# Patient Record
Sex: MALE | Race: BLACK OR AFRICAN AMERICAN | NOT HISPANIC OR LATINO | URBAN - METROPOLITAN AREA
[De-identification: names, ages, dates, MRNs, and addresses within clinical notes are randomized per-mention and may not be internally consistent; named-entity substitution may affect disease eponyms.]

---

## 2017-09-04 ENCOUNTER — EMERGENCY (EMERGENCY)
Facility: HOSPITAL | Age: 62
LOS: 1 days | Discharge: HOME | End: 2017-09-04
Attending: EMERGENCY MEDICINE | Admitting: EMERGENCY MEDICINE
Payer: SELF-PAY

## 2017-09-04 VITALS
DIASTOLIC BLOOD PRESSURE: 95 MMHG | TEMPERATURE: 97 F | RESPIRATION RATE: 18 BRPM | OXYGEN SATURATION: 96 % | SYSTOLIC BLOOD PRESSURE: 147 MMHG | HEART RATE: 84 BPM | WEIGHT: 179.02 LBS

## 2017-09-04 DIAGNOSIS — S01.511A LACERATION WITHOUT FOREIGN BODY OF LIP, INITIAL ENCOUNTER: ICD-10-CM
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
## 2017-09-04 DIAGNOSIS — Z91.013 ALLERGY TO SEAFOOD: ICD-10-CM
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
## 2017-09-04 PROCEDURE — 99053 MED SERV 10PM-8AM 24 HR FAC: CPT

## 2017-09-04 PROCEDURE — 99285 EMERGENCY DEPT VISIT HI MDM: CPT | Mod: 25

## 2017-09-04 PROCEDURE — 12051 INTMD RPR FACE/MM 2.5 CM/<: CPT

## 2017-09-04 PROCEDURE — 90715 TDAP VACCINE 7 YRS/> IM: CPT

## 2017-09-04 PROCEDURE — 99284 EMERGENCY DEPT VISIT MOD MDM: CPT | Mod: 25

## 2017-09-04 PROCEDURE — 90471 IMMUNIZATION ADMIN: CPT

## 2017-09-04 RX ORDER — TETANUS TOXOID, REDUCED DIPHTHERIA TOXOID AND ACELLULAR PERTUSSIS VACCINE, ADSORBED 5; 2.5; 8; 8; 2.5 [IU]/.5ML; [IU]/.5ML; UG/.5ML; UG/.5ML; UG/.5ML
0.5 SUSPENSION INTRAMUSCULAR ONCE
Qty: 0 | Refills: 0 | Status: COMPLETED | OUTPATIENT
Start: 2017-09-04 | End: 2017-09-04

## 2017-09-04 RX ADMIN — TETANUS TOXOID, REDUCED DIPHTHERIA TOXOID AND ACELLULAR PERTUSSIS VACCINE, ADSORBED 0.5 MILLILITER(S): 5; 2.5; 8; 8; 2.5 SUSPENSION INTRAMUSCULAR at 23:36

## 2017-09-04 NOTE — ED PROVIDER NOTE - OBJECTIVE STATEMENT
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

## 2017-09-04 NOTE — ED ADULT NURSE NOTE - OBJECTIVE STATEMENT
Patient presents with laceration to upper lip after a piece of a glass fell on his lip. Will continue to monitor patient.

## 2017-09-04 NOTE — ED PROVIDER NOTE - MEDICAL DECISION MAKING DETAILS
healthy 63 yo male with cut above lip by glass bottle.  will give tetanus.  laceration contacts the vermillion border, will consult plastics to close.

## 2020-01-06 NOTE — ED PROVIDER NOTE - CONSTITUTIONAL DISTRESS
no apparent Bexarotene Pregnancy And Lactation Text: This medication is Pregnancy Category X and should not be given to women who are pregnant or may become pregnant. This medication should not be used if you are breast feeding.

## 2023-03-06 ENCOUNTER — EMERGENCY (EMERGENCY)
Facility: HOSPITAL | Age: 68
LOS: 1 days | Discharge: HOME | End: 2023-03-06
Admitting: EMERGENCY MEDICINE
Payer: OTHER MISCELLANEOUS

## 2023-03-06 VITALS
HEIGHT: 68 IN | WEIGHT: 175.05 LBS | HEART RATE: 71 BPM | RESPIRATION RATE: 18 BRPM | SYSTOLIC BLOOD PRESSURE: 143 MMHG | TEMPERATURE: 98 F | DIASTOLIC BLOOD PRESSURE: 89 MMHG | OXYGEN SATURATION: 98 %

## 2023-03-06 PROCEDURE — 99283 EMERGENCY DEPT VISIT LOW MDM: CPT

## 2023-03-06 PROCEDURE — 99282 EMERGENCY DEPT VISIT SF MDM: CPT

## 2023-03-06 NOTE — ED PROVIDER NOTE - PATIENT PORTAL LINK FT
You can access the FollowMyHealth Patient Portal offered by [facility] by registering at the following website: [email or web address]. By joining [email or web address]’s FollowMyHealth portal, you will also be able to view your health information using other applications (apps) compatible with our system.

## 2023-03-06 NOTE — ED ADULT NURSE NOTE - OBJECTIVE STATEMENT
Pt received aaox3 c/o right eye pain after he was hit in the eye with a garbage bag around 3pm today. Denies LOC.  Denies headache, nausea, vomiting, dizziness, visual changes.  Patient not on any anticoagulation.  Reports some redness to the eye.

## 2023-03-06 NOTE — ED PROVIDER NOTE - CLINICAL SUMMARY MEDICAL DECISION MAKING FREE TEXT BOX
67-year-old male with no past medical history complaining of right eye ayleen/redness  after he was hit in the eye with a garbage bag around 3pm today.  Denies headache, nausea, vomiting, dizziness, visual changes.  PERRL; EOM intact; R eye- + subconjunctival hemorrhage to lateral aspect of eye, no fluorescein uptake. +subconjunctival hemorrhage

## 2023-03-06 NOTE — ED PROVIDER NOTE - NSFOLLOWUPINSTRUCTIONS_ED_ALL_ED_FT
Subconjunctival Hemorrhage    WHAT YOU NEED TO KNOW:    A subconjunctival hemorrhage is when blood collects under the conjunctiva in your eye. The conjunctiva is the clear lining that covers the white part of your eye. The blood comes from broken blood vessels under the conjunctiva.    DISCHARGE INSTRUCTIONS:    Care for your eye:   •Cold or warm compress: Use a cold pack during the first 24 hours. Ask how often to apply it and for how long each time. After the first 24 hours, apply a warm pack on your eye. Do this 3 times each day for about 10 to 15 minutes each time.      •Eyedrops: You may need artificial tears to keep your eye moist. Use the drops as directed.  Steps 1 2 3 4           Follow up with your healthcare provider or eye specialist as directed: Write down your questions so you remember to ask them during your visits.    Contact your healthcare provider or eye specialist if:   •The redness in your eye has not gone away after 3 weeks.      •You have another subconjunctival hemorrhage.      •You have subconjunctival hemorrhages in both eyes.      •You have questions or concerns about your condition or care.      Return to the emergency department if:   •You have eye pain or sensitivity to light.      •Your vision changes.      •You have white or yellow discharge from your eye.

## 2023-03-06 NOTE — ED ADULT NURSE NOTE - CCCP TRG CHIEF CMPLNT
Pharmacist Admission Medication Reconciliation Pending Note    Prior to Admission Medications were reviewed by the pharmacist and pended for provider review during admission medication reconciliation.    Medications were pended by the pharmacist at this time as follows:    Pended Admission Order Reconciliation Actions     Order Name Action Reordered As    Docusate Sodium (COLACE PO) Do Not Order for Admission     acetaminophen (TYLENOL) 325 MG tablet Do Not Order for Admission     fluconazole (DIFLUCAN) 150 MG tablet Do Not Order for Admission     ibuprofen (MOTRIN) 200 MG tablet Do Not Order for Admission     Prenatal Vit-Fe Fumarate-FA (PRENATAL ONE DAILY PO) Order for Admission multivitamin & mineral w/folic acid- PRENATAL tablet 1 tablet    docusate calcium (SURFAK) capsule 240 mg New at Admission     fluconazole (DIFLUCAN) tablet 150 mg New at Admission             Orders Pended To Continue For Hospital Stay     ID Description Pended By When Reason    [id number] docusate calcium (SURFAK) capsule 240 mg-DAILY PRN Byron Flores [facility] 08/30/17 0808     [id number] multivitamin & mineral w/folic acid- PRENATAL tablet 1 tablet-DAILY Byron Flores [facility] 08/30/17 0808     [id number] fluconazole (DIFLUCAN) tablet 150 mg-ONCE Byron Flores [facility] 08/30/17 0808             Pharmacist Notations:     Orders are pended in the New Orders section of the Med Rec Navigator.  PTA medications not addressed:  Augmentin - possible change in antibiotic therapy as inpatient.    Last edited by Byron Flores RPH on 08/30/17 at 0809          Orders that are ultimately reconciled and signed during admission medication reconciliation may differ from the pended actions above.    Please contact the pharmacist for questions.    Byron THOMAS RPH  8/30/2017 8:09 AM  
eye pain traumatic

## 2023-03-06 NOTE — ED PROVIDER NOTE - OBJECTIVE STATEMENT
67-year-old male with no past medical history complaining of right eye pain after he was hit in the eye with a garbage bag around 3pm today.  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  Denies LOC.  Denies headache, nausea, vomiting, dizziness, visual changes.  Patient not on any anticoagulation.  Reports some redness to the eye

## 2023-03-06 NOTE — ED ADULT NURSE NOTE - PRO INTERPRETER NEED 2
English Anesthesia Type: 1% lidocaine with 1:200,000 epinephrine and a 1:10 solution of 8.4% sodium bicarbonate

## 2023-03-06 NOTE — ED ADULT TRIAGE NOTE - BP NONINVASIVE SYSTOLIC (MM HG)
Codi [phone] advocate  [facility] radiology department called. Codi would like a call regarding Ultra sound, she states she needs to know what the doctor is evaluating.   [phone]

## 2023-03-06 NOTE — ED PROVIDER NOTE - PHYSICAL EXAMINATION
CONSTITUTIONAL: Well-appearing;  in no apparent distress.   HEAD: Normocephalic; atraumatic.   EYES: PERRL; EOM intact; R eye- + subconjunctival hemorrhage to lateral aspect of eye, no fluorescein uptake   ENT: normal nose; no rhinorrhea; normal pharynx with no erythema or lesions.   NECK: Supple; non-tender;   CARDIOVASCULAR: rrr,  RESPIRATORY: Breathing easily;   MSK: FROM at all extremities, normal tone   EXT: No cyanosis or edema; N/V intact  SKIN: Normal for age and race; warm; dry; good turgor; no apparent lesions or rash.

## 2023-03-07 DIAGNOSIS — Z91.013 ALLERGY TO SEAFOOD: ICD-10-CM
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
## 2023-03-07 DIAGNOSIS — H57.11 OCULAR PAIN, RIGHT EYE: ICD-10-CM

## 2023-03-07 DIAGNOSIS — H11.31 CONJUNCTIVAL HEMORRHAGE, RIGHT EYE: ICD-10-CM
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
## 2024-04-23 NOTE — ED ADULT TRIAGE NOTE - SOURCE OF INFORMATION
PT'S PHARMACY CALLED + SAID THEY ARE ALL OUT OF STOCK OF THE   tirzepatide (Mounjaro) 5 mg/0.5 mL pen injector, BUT THEY HAVE EVERY OTHER DOSE IN STOCK.    WOULD LIKE TO KNOW IF CJ WANTS TO ORDER ANOTHER DOSE.    PLEASE ADVISE    Patient

## 2024-06-10 ENCOUNTER — EMERGENCY (EMERGENCY)
Facility: HOSPITAL | Age: 69
LOS: 1 days | Discharge: HOME | End: 2024-06-10
Attending: STUDENT IN AN ORGANIZED HEALTH CARE EDUCATION/TRAINING PROGRAM | Admitting: STUDENT IN AN ORGANIZED HEALTH CARE EDUCATION/TRAINING PROGRAM
Payer: SELF-PAY

## 2024-06-10 VITALS
TEMPERATURE: 98 F | SYSTOLIC BLOOD PRESSURE: 149 MMHG | OXYGEN SATURATION: 95 % | HEART RATE: 88 BPM | DIASTOLIC BLOOD PRESSURE: 87 MMHG | RESPIRATION RATE: 16 BRPM

## 2024-06-10 DIAGNOSIS — S61.214A LACERATION WITHOUT FOREIGN BODY OF RIGHT RING FINGER WITHOUT DAMAGE TO NAIL, INITIAL ENCOUNTER: ICD-10-CM
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
## 2024-06-10 DIAGNOSIS — Z91.013 ALLERGY TO SEAFOOD: ICD-10-CM

## 2024-06-10 DIAGNOSIS — Z23 ENCOUNTER FOR IMMUNIZATION: ICD-10-CM
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
## 2024-06-10 PROCEDURE — 73130 X-RAY EXAM OF HAND: CPT | Mod: 26,RT

## 2024-06-10 PROCEDURE — 12001 RPR S/N/AX/GEN/TRNK 2.5CM/<: CPT

## 2024-06-10 PROCEDURE — 12041 INTMD RPR N-HF/GENIT 2.5CM/<: CPT | Mod: F8

## 2024-06-10 PROCEDURE — 73130 X-RAY EXAM OF HAND: CPT

## 2024-06-10 PROCEDURE — 99284 EMERGENCY DEPT VISIT MOD MDM: CPT | Mod: 25

## 2024-06-10 PROCEDURE — 90471 IMMUNIZATION ADMIN: CPT

## 2024-06-10 PROCEDURE — 90715 TDAP VACCINE 7 YRS/> IM: CPT

## 2024-06-10 PROCEDURE — 73140 X-RAY EXAM OF FINGER(S): CPT

## 2024-06-10 PROCEDURE — 73140 X-RAY EXAM OF FINGER(S): CPT | Mod: 26,59,RT

## 2024-06-10 RX ORDER — BACITRACIN ZINC 500 UNIT/G
1 OINTMENT IN PACKET (EA) TOPICAL ONCE
Refills: 0 | Status: COMPLETED | OUTPATIENT
Start: 2024-06-10 | End: 2024-06-10

## 2024-06-10 RX ORDER — TETANUS TOXOID, REDUCED DIPHTHERIA TOXOID AND ACELLULAR PERTUSSIS VACCINE, ADSORBED 5; 2.5; 8; 8; 2.5 [IU]/.5ML; [IU]/.5ML; UG/.5ML; UG/.5ML; UG/.5ML
0.5 SUSPENSION INTRAMUSCULAR ONCE
Refills: 0 | Status: COMPLETED | OUTPATIENT
Start: 2024-06-10 | End: 2024-06-10

## 2024-06-10 RX ORDER — LIDOCAINE HCL 20 MG/ML
10 VIAL (ML) INJECTION ONCE
Refills: 0 | Status: COMPLETED | OUTPATIENT
Start: 2024-06-10 | End: 2024-06-10

## 2024-06-10 RX ADMIN — Medication 10 MILLILITER(S): at 21:37

## 2024-06-10 RX ADMIN — Medication 1 APPLICATION(S): at 22:15

## 2024-06-10 RX ADMIN — TETANUS TOXOID, REDUCED DIPHTHERIA TOXOID AND ACELLULAR PERTUSSIS VACCINE, ADSORBED 0.5 MILLILITER(S): 5; 2.5; 8; 8; 2.5 SUSPENSION INTRAMUSCULAR at 22:16

## 2024-06-10 NOTE — ED ADULT NURSE NOTE - OBJECTIVE STATEMENT
68 yo M presents to the ED co laceration to R 4th finger. Pt awake and alert, AOx4. Pt reports he was cleaning out the compactor at work and something cut his R 4th finger. Pt presents with laceration to R 4th finger with bleeding controlled. Pt denies AC use. Pt denies all other medical co.

## 2024-06-10 NOTE — ED PROVIDER NOTE - OBJECTIVE STATEMENT
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  denies head trauma or LOC.  Denies other injury.

## 2024-06-10 NOTE — ED PROVIDER NOTE - CLINICAL SUMMARY MEDICAL DECISION MAKING FREE TEXT BOX
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 On exam, bp 149/87, VS otherwise wnl, laceration to R digit 4.    ddx: laceration vs fx    Plan:  - XR R digit 4 shows no fx/fb on my review-  - will update tdap  - sutures placed in R 4th digit   - will dc with wound care and pmd f/u

## 2024-06-10 NOTE — ED PROVIDER NOTE - PHYSICAL EXAMINATION
GENERAL:  Awake, alert and in NAD, non-toxic appearing  ENMT: Airway patent  EYES: conjunctiva clear  CARDIAC: well perfused  RESPIRATORY: pt breathing and speaking comfortably with no increased WOB, no accessory mm. use, no intercostal retractions, no nasal flaring  SKIN: warm and dry, no rashes, 2 cm linear laceration on medial aspect of R digit 4 distal phalanx region, overlying dressing with dried blood. some active bleeding when dressing uncovered  PSYCH: awake, alert, calm and cooperative  EXTREMITIES: no ble edema  NEURO: gcs 15

## 2024-06-10 NOTE — ED PROVIDER NOTE - NS ED ROS FT
positive: Right digit for laceration, bleeding   negative: Fever, chills, congestion, cough, chest pain, shortness of breath, nausea, vomiting, diarrhea, abdominal pain, head trauma, LOC

## 2024-06-10 NOTE — ED PROVIDER NOTE - NSFOLLOWUPINSTRUCTIONS_ED_ALL_ED_FT
REMOVE YOUR SUTURES IN 7 TO 10 DAYS AT AN URGENT CARE OR EMERGENCY DEPARTMENT OR PRIMARY CARE DOCTOR.     Please reach out to Lisseth Fulton ([facility] ED clinical referral coordinator) to assist you with your follow-up appointment.     Monday - Friday 11am-7pm  [phone]  [email or web address]    1. You were seen for laceration. A copy of any of your resulted labs, imaging, and findings have been provided to you. Make sure to view any test results that may not have yet resulted at the time of your discharge by creating a FollowMyHealth account at: [email or web address] to sign up for FollowMyHealth. Please review all of your lab, imaging, and all other results in their entirety with your primary care doctor.   2. Continue to take your home medications as prescribed. Take over the counter motrin 600 mg with food every six hours (do not exceed 3,200 mg in a 24 hour period) and supplement with tylenol 650 mg every six hours (do not exceed 4000 mg of tylenol in a 24 hour period and do not drink alcohol while taking tylenol) between the motrin dosages to have a layered effect. Consult a pharmacist or your primary care doctor with any questions. Keep your finger clean and dry with soap and water, and covered with bacitracin and gauze.   3. Follow up with your primary care doctor within 48 hours to assess the symptoms you were seen for in the emergency department and to review all results from your visit. If you don't have a doctor, call [phone] to make an appointment.  4. Return immediately to the emergency department for new, persistent, or worsening symptoms or signs. Return immediately to the emergency department if you have chest pain, shortness of breath, loss of consciousness, bleeding, discoloration, fever, pain, numbness, or weakness.   5. For your for health, you should make healthy food choices and be physically active. Also, you should not smoke or use drugs, and you should not drink alcohol in excess. Please visit [email or web address] for resources and more information.

## 2024-06-10 NOTE — ED PROVIDER NOTE - PATIENT PORTAL LINK FT
You can access the FollowMyHealth Patient Portal offered by [facility] by registering at the following website: [email or web address]. By joining [employer]’s FollowMyHealth portal, you will also be able to view your health information using other applications (apps) compatible with our system.